# Patient Record
Sex: FEMALE | Race: WHITE
[De-identification: names, ages, dates, MRNs, and addresses within clinical notes are randomized per-mention and may not be internally consistent; named-entity substitution may affect disease eponyms.]

---

## 2020-11-24 ENCOUNTER — HOSPITAL ENCOUNTER (OUTPATIENT)
Dept: HOSPITAL 76 - DI | Age: 50
Discharge: HOME | End: 2020-11-24
Attending: INTERNAL MEDICINE
Payer: MEDICAID

## 2020-11-24 DIAGNOSIS — M47.816: ICD-10-CM

## 2020-11-24 DIAGNOSIS — M43.16: Primary | ICD-10-CM

## 2020-11-24 DIAGNOSIS — R93.6: ICD-10-CM

## 2020-11-24 PROCEDURE — 72148 MRI LUMBAR SPINE W/O DYE: CPT

## 2020-11-24 NOTE — MRI REPORT
PROCEDURE:  Hip LT W/O

 

INDICATIONS:  LUMBAR INTERVERTEBRAL DISC DEGENERATION

 

TECHNIQUE:  

Noncontrast coronal T1 spin echo and STIR through the bony pelvis.  Coronal and axial T2 fast spin ec
ho with fat saturation, sagittal T1 spin echo, and oblique axial T2 fast spin echo with fat saturatio
n through the hip.  

 

COMPARISON:  None.

 

FINDINGS:  

Image quality:  Excellent.  

 

Bones and joints:  Bone marrow of the pelvic ring and proximal femurs show normal signal throughout. 
 No intraosseous lesions or fractures.  No avascular necrosis of the femoral heads. Disc desiccation 
and mild facet hypertrophy are seen in the included lower lumbar spine.

 

Tendons: There is mild distal left gluteus medius and minimus tendinosis, with similar findings on th
e contralateral right side.  The iliopsoas tendon appears intact, without adjacent bursal fluid colle
ctions.  The origin of the hamstring tendon is intact at the ischial tuberosity.  

 

Labrum and cartilage:  There is mild degeneration of the anterior and superior acetabular labrum with
out a discrete tear. No full-thickness cartilage defect is seen. The morphology of the femoral head a
nd acetabulum appears normal.

 

Soft tissues:  Visualized muscles demonstrate normal bulk and internal signal.  The proximal sciatic 
neurovascular bundle appears normal adjacent to the hamstring tendons.  No free pelvic fluid.  Bladde
r wall thickness is normal.  Genitourinary structures and bowel loops appear normal where visualized.
  

 

IMPRESSION:  

1.  Mild degeneration of the anterosuperior labrum without a discrete labral tear.

 

2.  Mild distal gluteus medius and gluteus minimus tendinosis bilaterally.

 

3.  Degenerative changes in the lower lumbar spine are better evaluated on dedicated lumbar spine MRI
 performed on the same day.

 

Reviewed by: Jorge Matos MD on 11/24/2020 12:29 PM PST

Approved by: Jorge Matos MD on 11/24/2020 12:29 PM PST

 

 

Station ID:  [id number]

## 2020-11-24 NOTE — MRI REPORT
PROCEDURE:  Lumbar Spine W/O

 

INDICATIONS:  LUMBAR INTERVERTEBRAL DISC DEGENERATION

 

TECHNIQUE:  

Noncontrast sagittal T1 spin echo and T2 fast echo, sagittal STIR, axial T1 and T2 fast spin echo thr
ough the lumbar spine.  In cases with scoliosis, additional coronal T2 fast spin echo may be performe
d.  

 

COMPARISON:  None.

 

FINDINGS:  

Image quality:  Excellent.  

 

Alignment and Curvature:  There is trace retrolisthesis of L4 on L5.

 

Bone Marrow:  Marrow is of normal overall signal.  No acute vertebral body compression fractures.  

 

Spinal Cord:  Conus medullaris terminates at the L1 level.  Visualized cord demonstrates normal signa
l and size.  

 

Paraspinous Soft Tissues:  No paravertebral masses.  

 

Discs: Mild to moderate desiccation is present L4-5, L5-S1.

 

L1-L2:    Minimal disc bulge without spinal stenosis or foraminal narrowing. 

 

L2-L3:    Minimal disc bulge without spinal stenosis or foraminal narrowing. Mild ligamentum flavum h
ypertrophy. 

 

L3-L4:   Minimal disc bulge without spinal stenosis or foraminal narrowing. Ligamentum flavum hypertr
ophy is present. 

 

L4-L5:   Mild disc bulge with minimal canal narrowing. Mild bilateral foraminal narrowing with facet 
and ligamentum flavum hypertrophy. 

 

L5-S1:   Mild disc bulge without spinal stenosis. Minimal left foraminal narrowing.  

 

IMPRESSION:  

 

 

1. Minimal to mild multilevel disc bulges.

 

 

2. Minimal to mild bilateral foraminal narrowing most notable L4-5 secondary to retrolisthesis as wel
l as facet/ligament of flavum arthropathy.

 

Reviewed by: Stephany Jaimes MD on 11/24/2020 3:04 PM [facility]

Approved by: Stephany Jaimes MD on 11/24/2020 3:04 PM [facility]

 

 

Station ID:  [id number]

## 2021-07-30 ENCOUNTER — HOSPITAL ENCOUNTER (OUTPATIENT)
Dept: HOSPITAL 76 - DI | Age: 51
Discharge: HOME | End: 2021-07-30
Attending: INTERNAL MEDICINE
Payer: MEDICAID

## 2021-07-30 DIAGNOSIS — R59.0: Primary | ICD-10-CM

## 2021-07-30 DIAGNOSIS — K76.0: ICD-10-CM

## 2021-07-30 DIAGNOSIS — N20.0: ICD-10-CM

## 2021-07-30 NOTE — ULTRASOUND REPORT
PROCEDURE:  Abdomen Complete

 

INDICATIONS:  IBS

 

TECHNIQUE:  

Real-time scanning was performed of the abdominal and retroperitoneal organs, with image documentatio
n.  

 

COMPARISON:  None.

 

FINDINGS:  

 

Liver: Increased hepatic parenchymal echogenicity indicative of hepatic steatosis..  

 

Gallbladder: Normal without findings of cholecystitis or cholelithiasis. Threshold enlarged lymph nod
e near the gallbladder neck in the periportal region measures 1.3 cm in short axis diameter.

 

Biliary ducts: Normal caliber intrahepatic and extra hepatic biliary ducts.

 

Pancreas:  Visualized portions of the pancreas are sonographically normal.  

 

Spleen:  Spleen is normal in size and homogeneous in echotexture.  

 

Kidneys: Normal size and appearance of both kidneys. Nonobstructing right renal calculus in the infer
ior pole suspected, measuring approximately 3 mm.

 

Aorta:  Visualized aorta is normal in caliber at less than 3 cm.  

 

Iliacs:  Proximal common iliac arteries are normal in caliber at less than 2.5 cm.  

 

IVC:  Intrahepatic inferior vena cava is patent.  

 

Miscellaneous:  No free abdominal fluid.  

 

IMPRESSION:  

Threshold enlarged lymph node in the periportal/peripancreatic region measuring 1.3 cm short axis myke
meter. A CT of the abdomen and pelvis with IV contrast is recommended for further evaluation.

 

Mild hepatic steatosis.

 

Nonobstructing right renal calculus.

 

Reviewed by: Matty Chow MD on 7/30/2021 9:14 AM PDT

Approved by: Matty Chow MD on 7/30/2021 9:14 AM PDT

 

 

Station ID:  [id number]

## 2021-08-28 ENCOUNTER — HOSPITAL ENCOUNTER (OUTPATIENT)
Dept: HOSPITAL 76 - DI | Age: 51
Discharge: HOME | End: 2021-08-28
Attending: INTERNAL MEDICINE
Payer: MEDICAID

## 2021-08-28 DIAGNOSIS — K58.8: ICD-10-CM

## 2021-08-28 DIAGNOSIS — M16.0: Primary | ICD-10-CM

## 2021-08-28 DIAGNOSIS — N20.0: ICD-10-CM

## 2021-08-28 NOTE — CT REPORT
PROCEDURE:  Abdomen/Pelvis WO

 

INDICATIONS:  BILATERAL ARTHRALGIA, IBS

 

TECHNIQUE:  

Noncontrast 5 mm thick sections acquired from the diaphragms to the symphysis.  5 mm coronal and sagi
ttal reformats were then performed.  For radiation dose reduction, the following was used:  automated
 exposure control, adjustment of mA and/or kV according to patient size.

 

COMPARISON:  None.

 

FINDINGS:  

Image quality:  Excellent.  

 

ABDOMEN:  

Lung bases: Subsegmental atelectasis in the lingula. No focal consolidation. Subpleural nodular opaci
ty at the left lung base is compatible with a normal lymph node. Heart size is normal.  

 

Solid organs:  Liver and spleen are normal in size.  Subcentimeter focal hypodensity in the left hepa
tic lobe is too small to characterize further but statistically likely to represent a simple cyst. Ga
llbladder is unremarkable.  Pancreas is normal in contours.  No adrenal nodules.  Kidneys are normal 
in size, without hydronephrosis. Two psunctate nonobstructing right kidney stones.

 

Peritoneum and bowel:  Unenhanced bowel loops demonstrate normal wall thickness and caliber.  Normal 
appendix. No free fluid or air.  

 

Nodes and vessels:  No retroperitoneal or mesenteric adenopathy by size criteria.  Aorta and inferior
 vena cava are normal in caliber.  Vascular calcification of the abdominal aorta.

 

Miscellaneous:  No ventral hernias.  

 

 

PELVIS:  

Genitourinary:  Bladder wall thickness is normal.  

 

Miscellaneous:  No inguinal hernias or adenopathy.  

 

Bones:  No suspicious bony lesions.  No vertebral body compression fractures.  

 

IMPRESSION:  

 

1. No acute intra-abdominal abnormality.

2. Nonobstructing punctate right kidney stones.

 

Reviewed by: Marshal Sexton on 8/28/2021 9:44 AM [facility]

Approved by: Marshal Sexton on 8/28/2021 9:44 AM [facility]

 

 

Station ID:  [id number]

## 2021-08-28 NOTE — XRAY REPORT
PROCEDURE:  Hips 2V BILAT

 

INDICATIONS:  BILATERAL ARTHRALGIA, IBS

 

TECHNIQUE:  4 views of the hip were acquired.  

 

COMPARISON:  11/24/2020 left hip MRI

 

FINDINGS:  

 

Bones:  No fractures or dislocations.  No suspicious bony lesions.  Minimal osteophyte formation of t
he acetabula bilaterally. The visualized pelvic ring appears intact.  

 

Soft tissues:  No suspicious soft tissue calcifications or masses.  Calcified phleboliths in the pelv
is.

 

IMPRESSION:  

 

1. No acute bony abnormality.

2. Minimal degenerative change of the hips.

 

Reviewed by: Marshal Sexton on 8/28/2021 9:31 AM [facility]

Approved by: Marshal Sexton on 8/28/2021 9:31 AM [facility]

 

 

Station ID:  [id number]

## 2021-09-06 ENCOUNTER — HOSPITAL ENCOUNTER (EMERGENCY)
Dept: HOSPITAL 76 - ED | Age: 51
Discharge: HOME | End: 2021-09-06
Payer: MEDICAID

## 2021-09-06 VITALS — DIASTOLIC BLOOD PRESSURE: 80 MMHG | SYSTOLIC BLOOD PRESSURE: 130 MMHG

## 2021-09-06 DIAGNOSIS — Z20.822: ICD-10-CM

## 2021-09-06 DIAGNOSIS — B97.89: ICD-10-CM

## 2021-09-06 DIAGNOSIS — Z87.891: ICD-10-CM

## 2021-09-06 DIAGNOSIS — J06.9: ICD-10-CM

## 2021-09-06 DIAGNOSIS — I10: ICD-10-CM

## 2021-09-06 DIAGNOSIS — J44.9: Primary | ICD-10-CM

## 2021-09-06 PROCEDURE — 99284 EMERGENCY DEPT VISIT MOD MDM: CPT

## 2021-09-06 PROCEDURE — 71046 X-RAY EXAM CHEST 2 VIEWS: CPT

## 2021-09-06 PROCEDURE — 99283 EMERGENCY DEPT VISIT LOW MDM: CPT

## 2021-09-06 PROCEDURE — 87635 SARS-COV-2 COVID-19 AMP PRB: CPT

## 2021-09-06 PROCEDURE — 94664 DEMO&/EVAL PT USE INHALER: CPT

## 2021-09-06 PROCEDURE — 94640 AIRWAY INHALATION TREATMENT: CPT

## 2021-09-06 NOTE — XRAY REPORT
PROCEDURE:  Chest 2 View X-Ray

 

INDICATIONS:  cough

 

TECHNIQUE:  2 view(s) of the chest.  

 

COMPARISON:  None.

 

FINDINGS:  

 

Surgical changes and devices:  None.  

 

Lungs and pleura:  No pleural effusions or pneumothorax.  Lungs are clear.  Flattening of the hemidia
phragms can be seen on the lateral view.  

 

Mediastinum:  Mediastinal contours are normal.  Heart size is normal.  

 

Bones and chest wall:  No suspicious bony abnormalities.  There is accentuated thoracic kyphosis.  Ag
e-appropriate degenerative changes are seen.    Soft tissues appear unremarkable.  

 

 

 

IMPRESSION:  Hyperexpanded lungs are seen, without an acute cardiopulmonary abnormality seen.  

 

Reviewed by: Luan Luna MD on 9/6/2021 11:37 AM [facility]

Approved by: Luan Luna MD on 9/6/2021 11:37 AM [facility]

 

 

Station ID:  [id number]

## 2021-09-06 NOTE — ED PHYSICIAN DOCUMENTATION
PD HPI URI





- Stated complaint


Stated Complaint: COUGH/CONGESTION





- Chief complaint


Chief Complaint: Resp





- History obtained from


History obtained from: Patient





- History of Present Illness


Timing - onset: How many weeks ago (1)


Timing duration: Weeks (1)


Timing details: Gradual onset


Pain level max: 3


Pain level now: 2


Associated symptoms: Nasal congestion, Rhinorrhea, Dry cough.  No: Fever, 

Chills, Hemoptysis, Chest pain


Contributing factors: Sick contact, COPD / asthma


Improves by: Rest


Worsened by: Activity, Breathing





- Additional information


Additional information: 





Patient is a 51-year-old female who has had her Covid vaccinations.  She states 

that about a week ago started having cough, congestion.  She has a history of 

COPD but currently does not have any inhalers.  She states that she feels like 

she has increased coughing when she lies flat.  The cough is mainly dry.  She 

quit smoking about 9 months ago.  Worse with deep breathing, nothing makes it 

better.  No fevers.  No chills.  





Review of Systems


Constitutional: denies: Fever, Chills


GI: denies: Vomiting, Diarrhea


Skin: denies: Rash





PD PAST MEDICAL HISTORY





- Past Medical History


Past Medical History: Yes


Cardiovascular: Hypertension


Respiratory: COPD





- Past Surgical History


Past Surgical History: No





- Present Medications


Home Medications: 


                                Ambulatory Orders











 Medication  Instructions  Recorded  Confirmed


 


Albuterol Sulf [Ventolin Hfa 1 - 2 puffs INH Q4HR PRN #1 inhaler 09/06/21 





Inhaler]   


 


Benzonatate [Tessalon] 200 mg PO TID PRN #30 cap 09/06/21 


 


predniSONE [Deltasone] 10 mg PO [id number] #42 tab 09/06/21 














- Allergies


Allergies/Adverse Reactions: 


                                    Allergies











Allergy/AdvReac Type Severity Reaction Status Date / Time


 


Sulfa (Sulfonamide Allergy  Emesis Verified 09/06/21 12:06





Antibiotics)     














- Living Situation


Living Situation: reports: With family


Living Arrangement: reports: At home





PD ED PE NORMAL





- Vitals


Vital signs reviewed: Yes





- General


General: Alert and oriented X 3, No acute distress





- HEENT


HEENT: Ears normal, Moist mucous membranes, Pharynx benign





- Neck


Neck: Supple, no meningeal sign





- Cardiac


Cardiac: RRR





- Respiratory


Respiratory: No respiratory distress, Other (Mild wheezing bilaterally)





- Abdomen


Abdomen: Soft, Non tender, Non distended





- Derm


Derm: Warm and dry





- Extremities


Extremities: No edema, No calf tenderness / cord





- Neuro


Neuro: Alert and oriented X 3





- Psych


Psych: Normal mood, Normal affect





Results





- Vitals


Vitals: 


                               Vital Signs - 24 hr











  09/06/21 09/06/21 09/06/21





  12:06 14:10 14:56


 


Temperature 36.9 C  36.8 C


 


Heart Rate 90 88 86


 


Respiratory 16 20 16





Rate   


 


Blood Pressure 150/88 H  130/80


 


O2 Saturation 98  100








                                     Oxygen











O2 Source                      Room air

















- Rads (name of study)


  ** Chest x-ray


Radiology: Final report received, EMP read contemporaneously, See rad report 

(Hyperexpanded lungs, no pneumonia)





PD MEDICAL DECISION MAKING





- ED course


Complexity details: reviewed results, re-evaluated patient, considered 

differential, d/w patient


ED course: 





51-year-old female with COPD.  No acute findings on x-ray.  Covid test sent.  

Given DuoNeb treatment, steroids and Tessalon.  We will place her on a steroid 

taper for home, albuterol inhaler and Tessalon.  Patient is well-appearing, 

nontoxic.  Afebrile.  No hypoxia.  No respiratory distress.  Patient counseled 

regarding signs and symptoms for which I believe and urgent re-evaluation would 

be necessary. Patient with good understanding of and agreement to plan and is 

comfortable going home at this time





This document was made in part using voice recognition software. While efforts 

are made to proofread this document, sound alike and grammatical errors may 

occur.





Departure





- Departure


Disposition: 01 Home, Self Care


Clinical Impression: 


 Viral URI, COPD, moderate





Condition: Good


Instructions:  ED COPD Flare, ED Viral Syndrome


Follow-Up: 


Jimmy Caal MD [Primary Care Provider] - Within 1 week


Prescriptions: 


Albuterol Sulf [Ventolin Hfa Inhaler] 1 - 2 puffs INH Q4HR PRN #1 inhaler


 PRN Reason: Shortness Of Air/Wheezing


predniSONE [Deltasone] 10 mg PO [id number] #42 tab


Benzonatate [Tessalon] 200 mg PO TID PRN #30 cap


 PRN Reason: Cough


Comments: 


Use the inhaler and medications as prescribed.  Return if you worsen.  Follow-up

 with your doctor for further care. You do have a covid test pending. Your 

results can be viewed on the patient portal.  You should receive a phone call 

for a positive result





your [facility] were [facility] to [facility] in [place]


Discharge Date/Time: 09/06/21 14:56